# Patient Record
Sex: MALE | Race: WHITE | NOT HISPANIC OR LATINO | Employment: FULL TIME | URBAN - METROPOLITAN AREA
[De-identification: names, ages, dates, MRNs, and addresses within clinical notes are randomized per-mention and may not be internally consistent; named-entity substitution may affect disease eponyms.]

---

## 2023-11-17 ENCOUNTER — PHARMACY VISIT (OUTPATIENT)
Dept: PHARMACY | Facility: CLINIC | Age: 18
End: 2023-11-17
Payer: COMMERCIAL

## 2023-11-17 ENCOUNTER — HOSPITAL ENCOUNTER (EMERGENCY)
Facility: HOSPITAL | Age: 18
Discharge: HOME | End: 2023-11-17
Payer: COMMERCIAL

## 2023-11-17 DIAGNOSIS — S76.111A SPRAIN, QUADRICEP, RIGHT, INITIAL ENCOUNTER: Primary | ICD-10-CM

## 2023-11-17 PROCEDURE — 99282 EMERGENCY DEPT VISIT SF MDM: CPT

## 2023-11-17 PROCEDURE — RXMED WILLOW AMBULATORY MEDICATION CHARGE

## 2023-11-17 PROCEDURE — 99285 EMERGENCY DEPT VISIT HI MDM: CPT

## 2023-11-17 RX ORDER — NAPROXEN 500 MG/1
500 TABLET ORAL
Qty: 17 TABLET | Refills: 0 | Status: SHIPPED | OUTPATIENT
Start: 2023-11-17

## 2023-11-17 ASSESSMENT — LIFESTYLE VARIABLES
EVER FELT BAD OR GUILTY ABOUT YOUR DRINKING: NO
HAVE YOU EVER FELT YOU SHOULD CUT DOWN ON YOUR DRINKING: NO
EVER HAD A DRINK FIRST THING IN THE MORNING TO STEADY YOUR NERVES TO GET RID OF A HANGOVER: NO
REASON UNABLE TO ASSESS: NO
HAVE PEOPLE ANNOYED YOU BY CRITICIZING YOUR DRINKING: NO

## 2023-11-17 NOTE — Clinical Note
Dino Wren was seen and treated in our emergency department on 11/17/2023.  He may return to work on 11/20/2023.  Work as much as the ace wrap and crutches allow     If you have any questions or concerns, please don't hesitate to call.      Jesse Collins, APRN-CNP

## 2023-11-17 NOTE — PROGRESS NOTES
Medication Education     Medication education for Dino Wren was provided to the patient  for the following medication(s):    Naproxen 500 mg by mouth twice a day with meals    Medication education provided by a Pharmacist:  Dose, frequency, storage    Identified potential barriers to education:  None    Method(s) of Education:  Verbal    An opportunity to ask questions and receive answers was provided.     Assessment of understanding the patient :  2= meets goals/outcomes      Meme Sanchez   PharmD Candidate 2024

## 2023-11-17 NOTE — ED PROVIDER NOTES
"No chief complaint on file.      HPI       17 year old male presents to the Emergency Department today complaining of right thigh pain status post injury that occurred yesterday. Notes that he felt a \"pop\" in his right thigh as he was running to first base while playing kickball. States that the pain is worse with movement. Denies any loss of function or paresthesias. Denies any other injuries. Denies any associated fever, chills, headache, neck pain, chest pain, shortness of breath, abdominal pain, nausea, vomiting, diarrhea, constipation, or urinary symptoms.       History provided by:  Patient             Patient History   No past medical history on file.  No past surgical history on file.  No family history on file.  Social History     Tobacco Use   • Smoking status: Not on file   • Smokeless tobacco: Not on file   Substance Use Topics   • Alcohol use: Not on file   • Drug use: Not on file           Physical Exam  Constitutional:       General: He is awake.      Appearance: Normal appearance.   Cardiovascular:      Rate and Rhythm: Normal rate and regular rhythm.      Pulses:           Radial pulses are 3+ on the right side and 3+ on the left side.      Heart sounds: Normal heart sounds. No murmur heard.     No friction rub. No gallop.   Pulmonary:      Effort: Pulmonary effort is normal.      Breath sounds: Normal breath sounds and air entry.   Musculoskeletal:      Comments: No obvious deformity, ecchymosis, or edema noted to the right upper leg, soft tissue tenderness present over the right thigh. Full ROM. Right posterior tibialis pulse is strong and regular. Capillary refill was within normal limits. Sensation is intact distally.    Neurological:      Mental Status: He is alert.   Psychiatric:         Behavior: Behavior is cooperative.         Labs Reviewed - No data to display    No orders to display            ED Course & MDM   Diagnoses as of 11/17/23 1201   Sprain, quadricep, right, initial encounter "           Medical Decision Making  Patient was seen and evaluated by myself. There is no bony tenderness. Therefore, I do not feel that x-rays are clinically indicated. Instructed to ice and elevate the sore area as much as possible. Given a prescription for Naprosyn. No contraindications to NSAIDs are noted. Placed in an ace wrap. Capillary refill was within normal limits and sensation is intact distally, post-application. Given crutches and to be weightbearing as tolerated. Able to return to work as much as the ace wrap and crutches allow. Follow up with Occupational Health in 3 days. Return if worse in any way. Discharged in stable condition with computer instructions.    Diagnostic Impression:     1. Acute right quad strain    2. Prescription therapy            Your medication list      You have not been prescribed any medications.           Procedure  Procedures     Jesse Collins, ALEJANDRO-CNP  11/17/23 0203